# Patient Record
Sex: FEMALE | Race: WHITE | NOT HISPANIC OR LATINO | Employment: FULL TIME | URBAN - METROPOLITAN AREA
[De-identification: names, ages, dates, MRNs, and addresses within clinical notes are randomized per-mention and may not be internally consistent; named-entity substitution may affect disease eponyms.]

---

## 2018-01-11 ENCOUNTER — OFFICE VISIT (OUTPATIENT)
Dept: URGENT CARE | Facility: CLINIC | Age: 28
End: 2018-01-11
Payer: COMMERCIAL

## 2018-01-11 PROCEDURE — 99283 EMERGENCY DEPT VISIT LOW MDM: CPT

## 2018-01-11 PROCEDURE — G0382 LEV 3 HOSP TYPE B ED VISIT: HCPCS

## 2018-01-13 NOTE — PROGRESS NOTES
Assessment   1  Acute epigastric pain ([phone] [id number]) (R10 13)    Discussion/Summary   Discussion Summary:    Patient to proceed to ED for further evaluation  Chief Complaint   1  Abdominal Pain  Chief Complaint Free Text Note Form: Patient relates started with mid abdominal pain and epigastric pain since 9:00 pm last night  + vomiting x1 today  Took TUMS today  + nausea  Denies diarrhea  History of Present Illness   HPI: 24yo F p/w epigastric pain, nausea, and lightheadedness x 20 hours  Pt states pain is 7/10 and burning in nature  Pt took several tums today without relief of sxs  Pt denies diarrhea shortness of breath chest pain palpitations  Hospital Based Practices Required Assessment:      Abuse And Domestic Violence Screen       Yes, the patient is safe at home  -- The patient states no one is hurting them  Depression And Suicide Screen  No, the patient has not had thoughts of hurting themself  No, the patient has not felt depressed in the past 7 days  Prefered Language is  english  Primary Language is  english  Review of Systems   Focused-Female:      Constitutional: No fever, no chills, feels well, no tiredness, no recent weight gain or loss  ENT: no ear ache, no loss of hearing, no nosebleeds or nasal discharge, no sore throat or hoarseness  Cardiovascular: no complaints of slow or fast heart rate, no chest pain, no palpitations, no leg claudication or lower extremity edema  Respiratory: no complaints of shortness of breath, no wheezing, no dyspnea on exertion, no orthopnea or PND  Breasts: no complaints of breast pain, breast lump or nipple discharge  Gastrointestinal: abdominal pain,-- nausea-- and-- vomiting, but-- no constipation,-- no diarrhea-- and-- no blood in stools  Genitourinary: no complaints of dysuria, no incontinence, no pelvic pain, no dysmenorrhea, no vaginal discharge or abnormal vaginal bleeding        Musculoskeletal: no complaints of arthralgia, no myalgia, no joint swelling or stiffness, no limb pain or swelling  Integumentary: no complaints of skin rash or lesion, no itching or dry skin, no skin wounds  Neurological: no complaints of headache, no confusion, no numbness or tingling, no dizziness or fainting  Past Medical History   1  No pertinent past medical history  Active Problems And Past Medical History Reviewed: The active problems and past medical history were reviewed and updated today  Family History   Mother    1  Family history of gastric ulcer (V18 59) (Z83 79)   2  Family history of hypertension (V17 49) (Z82 49)  Father    3  Family history of hypertension (V17 49) (Z82 49)  Family History Reviewed: The family history was reviewed and updated today  Social History    · Never a smoker  Social History Reviewed: The social history was reviewed and is unchanged  Surgical History   1  Denied: History Of Prior Surgery  Surgical History Reviewed: The surgical history was reviewed and updated today  Current Meds    1  No Reported Medications Recorded  Medication List Reviewed: The medication list was reviewed and updated today  Allergies   1  No Known Drug Allergies    Vitals   Signs   Recorded: [id number] 05:25PM   Temperature: 100 1 C, Tympanic  Heart Rate: 81  Respiration: 18  Systolic: 801, LUE, Sitting  Diastolic: 89, LUE, Sitting  Height: 5 ft 5 in  Weight: 139 lb 2 oz  BMI Calculated: 23 15  BSA Calculated: 1 7  O2 Saturation: 99, RA  Pain Scale: 7    Physical Exam        Constitutional      General appearance: Abnormal   acutely ill  Ears, Nose, Mouth, and Throat      External inspection of ears and nose: Normal        Otoscopic examination: Tympanic membranes translucent with normal light reflex  Canals patent without erythema  Nasal mucosa, septum, and turbinates: Abnormal        Oropharynx: Normal with no erythema, edema, exudate or lesions  Pulmonary      Respiratory effort: No increased work of breathing or signs of respiratory distress  Auscultation of lungs: Clear to auscultation  no rales or crackles were heard bilaterally  no rhonchi  no friction rub  no wheezing  no diminished breath sounds  Cardiovascular      Palpation of heart: Normal PMI, no thrills  Auscultation of heart: Normal rate and rhythm, normal S1 and S2, without murmurs  Examination of extremities for edema and/or varicosities: Normal        Abdomen      Abdomen: Abnormal   The abdomen was flat  Bowel sounds were normal  There was severe tenderness in the epigastric area-- and-- in the right upper quadrant  The abdomen was not firm  No guarding  no masses palpated  The abdomen was normal to percussion  no CVA tenderness      Musculoskeletal      Gait and station: Normal        Digits and nails: Normal without clubbing or cyanosis  Inspection/palpation of joints, bones, and muscles: Normal        Skin      Skin and subcutaneous tissue: Normal without rashes or lesions  Neurologic      Cranial nerves: Cranial nerves 2-12 intact  Reflexes: 2+ and symmetric  Sensation: No sensory loss         Psychiatric      Orientation to person, place, and time: Normal        Mood and affect: Normal        Signatures    Electronically signed by : CHUCK Mobley; Jan 11 2018  5:48PM EST                       (Author)     Electronically signed by : MAEGAN Ellis ; Jan 12 2018 10:30AM EST                       (Co-author)

## 2018-01-23 VITALS
BODY MASS INDEX: 23.18 KG/M2 | DIASTOLIC BLOOD PRESSURE: 89 MMHG | OXYGEN SATURATION: 99 % | SYSTOLIC BLOOD PRESSURE: 129 MMHG | HEIGHT: 65 IN | HEART RATE: 81 BPM | WEIGHT: 139.13 LBS | RESPIRATION RATE: 18 BRPM

## 2018-01-30 ENCOUNTER — OFFICE VISIT (OUTPATIENT)
Dept: URGENT CARE | Facility: CLINIC | Age: 28
End: 2018-01-30
Payer: COMMERCIAL

## 2018-01-30 VITALS
HEART RATE: 80 BPM | BODY MASS INDEX: 24.49 KG/M2 | SYSTOLIC BLOOD PRESSURE: 116 MMHG | DIASTOLIC BLOOD PRESSURE: 57 MMHG | TEMPERATURE: 98.2 F | WEIGHT: 147 LBS | OXYGEN SATURATION: 99 % | HEIGHT: 65 IN

## 2018-01-30 DIAGNOSIS — K80.50 BILIARY COLIC: Primary | ICD-10-CM

## 2018-01-30 PROCEDURE — 99283 EMERGENCY DEPT VISIT LOW MDM: CPT | Performed by: PHYSICIAN ASSISTANT

## 2018-01-30 PROCEDURE — G0382 LEV 3 HOSP TYPE B ED VISIT: HCPCS | Performed by: PHYSICIAN ASSISTANT

## 2018-01-30 RX ORDER — KETOROLAC TROMETHAMINE 30 MG/ML
30 INJECTION, SOLUTION INTRAMUSCULAR; INTRAVENOUS ONCE
Status: COMPLETED | OUTPATIENT
Start: 2018-01-30 | End: 2018-01-30

## 2018-01-30 RX ADMIN — KETOROLAC TROMETHAMINE 30 MG: 30 INJECTION, SOLUTION INTRAMUSCULAR; INTRAVENOUS at 17:41

## 2018-01-31 NOTE — PROGRESS NOTES
Assessment/Plan:      Diagnoses and all orders for this visit:    Biliary colic  -     Cancel: Throat culture  -     ketorolac (TORADOL) injection 30 mg; Infuse 1 mL (30 mg total) into a venous catheter once       There are no Patient Instructions on file for this visit  Subjective:     Patient ID: Kaitlin Wall is a 24 y o  female  Abdominal Pain   This is a recurrent problem  The current episode started today  The onset quality is sudden  The problem occurs constantly  The problem has been unchanged  The pain is located in the RUQ  The pain is at a severity of 7/10  The quality of the pain is colicky  The abdominal pain does not radiate  Pertinent negatives include no constipation, diarrhea, nausea or vomiting  The pain is aggravated by eating  The pain is relieved by nothing  She has tried nothing for the symptoms  Review of Systems   Constitutional: Negative  Gastrointestinal: Positive for abdominal pain  Negative for abdominal distention, anal bleeding, blood in stool, constipation, diarrhea, nausea, rectal pain and vomiting  Objective:     Physical Exam   Cardiovascular: Normal rate, regular rhythm, normal heart sounds and normal pulses  Pulmonary/Chest: Effort normal and breath sounds normal    Abdominal: Soft  Normal appearance and bowel sounds are normal  There is tenderness in the right upper quadrant  There is positive James's sign  There is no rigidity, no rebound, no guarding, no CVA tenderness and no tenderness at McBurney's point

## 2018-02-04 ENCOUNTER — OFFICE VISIT (OUTPATIENT)
Dept: URGENT CARE | Facility: CLINIC | Age: 28
End: 2018-02-04
Payer: COMMERCIAL

## 2018-02-04 VITALS
WEIGHT: 147 LBS | DIASTOLIC BLOOD PRESSURE: 75 MMHG | TEMPERATURE: 97 F | RESPIRATION RATE: 18 BRPM | BODY MASS INDEX: 24.49 KG/M2 | HEIGHT: 65 IN | OXYGEN SATURATION: 97 % | SYSTOLIC BLOOD PRESSURE: 145 MMHG | HEART RATE: 84 BPM

## 2018-02-04 DIAGNOSIS — K81.0 CHOLECYSTITIS, ACUTE: Primary | ICD-10-CM

## 2018-02-04 PROCEDURE — 99213 OFFICE O/P EST LOW 20 MIN: CPT

## 2018-02-04 NOTE — LETTER
February 4, 2018     Patient: Kalpana Strickland   YOB: 1990   Date of Visit: 2/4/2018       To Whom It May Concern: It is my medical opinion that Kalpana Strickland may return to work on 02/05/2018  If you have any questions or concerns, please don't hesitate to call  Sincerely,    Adriana Roy, DO

## 2018-02-04 NOTE — PROGRESS NOTES
[facility]        NAME: Joyce Saxena is a 24 y o  female  : 1990    MRN: [id number]  DATE: 2018  TIME: 2:50 PM    Assessment and Plan   No primary diagnosis found  No diagnosis found  Patient Instructions      Follow-up with your surgeon as soon as possible  In meantime rest and avoid fatty meals  Chief Complaint     Chief Complaint   Patient presents with    Abdominal Pain     has gallstone and surgery schueduled for the 21         History of Present Illness   Joyce Saxena presents to the clinic c/o    She complains of upper right quadrant abdominal pain that she has had off and on for 3 months  She is set to have a cholecystectomy in 2 weeks  She has been missing a lot of work and needs a note for work she also asked for something for pain  Abdominal Pain   This is a new problem  The current episode started 1 to 4 weeks ago  The onset quality is gradual  The problem occurs intermittently  Associated symptoms include diarrhea, a fever, nausea and vomiting  Review of Systems   Review of Systems   Constitutional: Positive for activity change, appetite change and fever  HENT: Negative  Gastrointestinal: Positive for abdominal pain, diarrhea, nausea and vomiting  Endocrine: Negative  Genitourinary: Negative  Current Medications     No long-term prescriptions on file         Current Allergies     Allergies as of 2018    (No Known Allergies)            The following portions of the patient's history were reviewed and updated as appropriate: allergies, current medications, past family history, past medical history, past social history, past surgical history and problem list     Objective   /75 (BP Location: Left arm, Patient Position: Sitting, Cuff Size: Standard)   Pulse 84   Temp (!) 97 °F (36 1 °C)   Resp 18   Ht 5' 5" (1 651 m)   Wt 66 7 kg (147 lb)   LMP  (LMP Unknown)   SpO2 97%   BMI 24 46 kg/m²        Physical Exam Physical Exam   Constitutional: She appears well-developed  Eyes: EOM are normal  Pupils are equal, round, and reactive to light  Neck: Normal range of motion  Cardiovascular: Normal rate and regular rhythm  Pulmonary/Chest: Effort normal and breath sounds normal    Abdominal: Soft  Right upper quadrant abdominal tenderness  No masses no CVA tenderness  No rebound  Decreased bowel sounds  Musculoskeletal: Normal range of motion  Nursing note and vitals reviewed

## 2018-02-04 NOTE — LETTER
February 6, 2018     Patient: Diane Sun   YOB: 1990   Date of Visit: 2/4/2018       To Whom It May Concern: It is my medical opinion that Diane Sun may return to work on 02/06/2018  Patient seen at urgent care on 2/4/18       If you have any questions or concerns, please don't hesitate to call           Sincerely,        ALMA [place]    CC: No Recipients

## 2018-02-05 ENCOUNTER — TELEPHONE (OUTPATIENT)
Dept: URGENT CARE | Facility: CLINIC | Age: 28
End: 2018-02-05

## 2018-02-06 ENCOUNTER — TELEPHONE (OUTPATIENT)
Dept: URGENT CARE | Facility: CLINIC | Age: 28
End: 2018-02-06

## 2018-02-22 ENCOUNTER — OFFICE VISIT (OUTPATIENT)
Dept: URGENT CARE | Facility: CLINIC | Age: 28
End: 2018-02-22
Payer: COMMERCIAL

## 2018-02-22 VITALS
SYSTOLIC BLOOD PRESSURE: 142 MMHG | RESPIRATION RATE: 18 BRPM | WEIGHT: 144.4 LBS | HEIGHT: 65 IN | DIASTOLIC BLOOD PRESSURE: 67 MMHG | TEMPERATURE: 99.6 F | OXYGEN SATURATION: 98 % | BODY MASS INDEX: 24.06 KG/M2 | HEART RATE: 90 BPM

## 2018-02-22 DIAGNOSIS — R11.0 NAUSEA: Primary | ICD-10-CM

## 2018-02-22 DIAGNOSIS — K80.50 BILIARY COLIC: ICD-10-CM

## 2018-02-22 PROCEDURE — G0382 LEV 3 HOSP TYPE B ED VISIT: HCPCS | Performed by: PHYSICIAN ASSISTANT

## 2018-02-22 PROCEDURE — S9083 URGENT CARE CENTER GLOBAL: HCPCS | Performed by: PHYSICIAN ASSISTANT

## 2018-02-22 RX ORDER — ONDANSETRON 4 MG/1
4 TABLET, FILM COATED ORAL EVERY 8 HOURS PRN
Qty: 20 TABLET | Refills: 0 | Status: SHIPPED | OUTPATIENT
Start: 2018-02-22

## 2018-02-22 NOTE — LETTER
February 22, 2018     Patient: Haleigh Benson   YOB: 1990   Date of Visit: 2/22/2018       To Whom it May Concern:    Haleigh Benson was seen in my clinic on 2/22/2018  She may return to work on 2/24/18  If you have any questions or concerns, please don't hesitate to call           Sincerely,          Benito Arellano PA-C        CC: No Recipients

## 2018-02-26 NOTE — PROGRESS NOTES
Assessment/Plan:      Diagnoses and all orders for this visit:    Nausea  -     ondansetron (ZOFRAN) 4 mg tablet; Take 1 tablet (4 mg total) by mouth every 8 (eight) hours as needed for nausea or vomiting    Biliary colic        Patient Instructions   zofran for nausea  Pt to follow up with general surgeon as scheduled      Subjective:    Chief Complaint   Patient presents with    Nausea      Patient ID: Deepak Lai is a 24 y o  female  Pt suffering from biliary colic  Pt gallbladder removal recently rescheduled for 6 weeks from today  Nausea   This is a chronic problem  The problem occurs constantly  The problem has been unchanged (pt ran out of zofran from general surgeon)  Associated symptoms include nausea  Pertinent negatives include no abdominal pain, anorexia, arthralgias, change in bowel habit, chest pain, chills, congestion, coughing, diaphoresis, fatigue, fever, headaches, joint swelling, myalgias, neck pain, numbness, rash, sore throat, swollen glands, urinary symptoms, vertigo, visual change, vomiting or weakness  The symptoms are aggravated by eating  She has tried NSAIDs and rest for the symptoms  The treatment provided no relief  Review of Systems   Constitutional: Negative for chills, diaphoresis, fatigue and fever  HENT: Negative for congestion and sore throat  Respiratory: Negative for cough  Cardiovascular: Negative for chest pain  Gastrointestinal: Positive for nausea  Negative for abdominal distention, abdominal pain, anal bleeding, anorexia, blood in stool, change in bowel habit, constipation, diarrhea, rectal pain and vomiting  Musculoskeletal: Negative for arthralgias, joint swelling, myalgias and neck pain  Skin: Negative for rash  Neurological: Negative for vertigo, weakness, numbness and headaches           Objective:    /67 (BP Location: Left arm, Patient Position: Sitting, Cuff Size: Adult)   Pulse 90   Temp 99 6 °F (37 6 °C) (Tympanic)   Resp 18   Ht 5' 5" (1 651 m)   Wt 65 5 kg (144 lb 6 4 oz)   LMP  (LMP Unknown)   SpO2 98%   BMI 24 03 kg/m²      Physical Exam   Constitutional: She is oriented to person, place, and time  She appears well-developed and well-nourished  She does not have a sickly appearance  Pt appears to be in acute pain  HENT:   Head: Normocephalic and atraumatic  Cardiovascular: Normal rate, regular rhythm, normal heart sounds and intact distal pulses  Exam reveals no gallop and no friction rub  No murmur heard  Pulmonary/Chest: Effort normal and breath sounds normal  No respiratory distress  She has no wheezes  She has no rales  She exhibits no tenderness  Abdominal: Soft  Bowel sounds are normal  She exhibits no distension and no mass  There is tenderness (RUQ)  There is no rebound and no guarding  Neurological: She is alert and oriented to person, place, and time

## 2025-01-22 ENCOUNTER — HOSPITAL ENCOUNTER (EMERGENCY)
Facility: HOSPITAL | Age: 35
Discharge: HOME | End: 2025-01-22
Attending: EMERGENCY MEDICINE | Admitting: EMERGENCY MEDICINE
Payer: COMMERCIAL

## 2025-01-22 VITALS
WEIGHT: 165 LBS | DIASTOLIC BLOOD PRESSURE: 84 MMHG | BODY MASS INDEX: 27.46 KG/M2 | RESPIRATION RATE: 16 BRPM | SYSTOLIC BLOOD PRESSURE: 152 MMHG | TEMPERATURE: 98.4 F | HEART RATE: 96 BPM | OXYGEN SATURATION: 99 %

## 2025-01-22 DIAGNOSIS — K04.7 DENTAL ABSCESS: Primary | ICD-10-CM

## 2025-01-22 PROCEDURE — 99282 EMERGENCY DEPT VISIT SF MDM: CPT

## 2025-01-22 PROCEDURE — 99284 EMERGENCY DEPT VISIT MOD MDM: CPT | Performed by: EMERGENCY MEDICINE

## 2025-01-22 RX ORDER — NAPROXEN 500 MG/1
500 TABLET ORAL 2 TIMES DAILY PRN
Qty: 30 TABLET | Refills: 0 | Status: SHIPPED | OUTPATIENT
Start: 2025-01-22

## 2025-01-22 RX ORDER — OXYCODONE HYDROCHLORIDE 5 MG/1
5 TABLET ORAL EVERY 6 HOURS PRN
Qty: 15 TABLET | Refills: 0 | Status: SHIPPED | OUTPATIENT
Start: 2025-01-22

## 2025-01-22 RX ORDER — CLINDAMYCIN HYDROCHLORIDE 150 MG/1
450 CAPSULE ORAL EVERY 8 HOURS
Qty: 63 CAPSULE | Refills: 0 | Status: SHIPPED | OUTPATIENT
Start: 2025-01-22 | End: 2025-01-29

## 2025-01-22 NOTE — DISCHARGE INSTRUCTIONS
Please take medications as prescribed.  Return if any worsening as discussed.  Follow-up with dental as discussed.    Thank you for choosing the emergency department at [facility]. We appreciated the opportunity and privilege to address your healthcare needs. We remain available to you should you require additional evaluation or assistance. We value your feedback and would appreciate the opportunity to address anything you identified as an opportunity to improve or where we excelled. If there are colleagues who deserve special recognition, please let us know! We hope you are feeling better soon!    Please also note that sometimes there are subtle abnormalities in your lab values that you may observe when you access your record online.  These are frequently not worrisome and if they are of concern we will have discussed them with you.  However, we always encourage that you discuss any concerns you may have or observe on your record with your primary care provider.   Please also note that while your visit documentation was reviewed prior to completion, voice transcription will occasionally recognize words or grammar differently than what was spoken.

## 2025-01-22 NOTE — ED PROVIDER NOTES
Time reflects when diagnosis was documented in both MDM as applicable and the Disposition within this note       Time User Action Codes Description Comment    1/22/2025  7:29 AM Michael Pulido Add [K04.7] Dental abscess           ED Disposition       ED Disposition   Discharge    Condition   Stable    Date/Time   Wed Jan 22, 2025  7:29 AM    Comment   Gladys Layton discharge to home/self care.                   Assessment & Plan       Medical Decision Making  Problems Addressed:  Dental abscess: self-limited or minor problem    Risk  Prescription drug management.             Medications - No data to display    ED Risk Strat Scores                          SBIRT 22yo+      Flowsheet Row Most Recent Value   Initial Alcohol Screen: US AUDIT-C     1. How often do you have a drink containing alcohol? 0 Filed at: 01/22/2025 0731   2. How many drinks containing alcohol do you have on a typical day you are drinking?  0 Filed at: 01/22/2025 0731   3b. FEMALE Any Age, or MALE 65+: How often do you have 4 or more drinks on one occassion? 0 Filed at: 01/22/2025 0731   Audit-C Score 0 Filed at: 01/22/2025 0731   KATIE: How many times in the past year have you...    Used an illegal drug or used a prescription medication for non-medical reasons? Weekly Filed at: 01/22/2025 0731                            History of Present Illness       Chief Complaint   Patient presents with    Dental Pain     Reports started 2 days ago with right sided facial swelling, did not call dentist and came right here for antibiotics.        Past Medical History:   Diagnosis Date    Gall stones       Past Surgical History:   Procedure Laterality Date    CHOLECYSTECTOMY        Family History   Family history unknown: Yes      Social History     Tobacco Use    Smoking status: Former     Current packs/day: 0.25     Types: Cigarettes    Smokeless tobacco: Never   Vaping Use    Vaping status: Never Used   Substance Use Topics    Alcohol use: No    Drug use:  Yes     Types: Marijuana      E-Cigarette/Vaping    E-Cigarette Use Never User       E-Cigarette/Vaping Substances      I have reviewed and agree with the history as documented.     34-year-old female with right sided facial swelling which began over the last 2 days and getting progressively worse.  Patient reports that he has known dental caries.  Patient reports some difficulty opening her mouth.  Able to manage her saliva.  No fevers or chills.      History provided by:  Patient  Dental Pain  Location:  Lower  Quality:  Aching, localized and pulsating  Severity:  Moderate  Onset quality:  Gradual  Timing:  Constant  Progression:  Worsening  Context: abscess    Ineffective treatments:  None tried  Associated symptoms: facial pain, facial swelling, gum swelling and headaches    Associated symptoms: no congestion, no difficulty swallowing, no drooling, no fever, no neck swelling, no oral bleeding, no oral lesions and no trismus        Review of Systems   Constitutional:  Negative for fever.   HENT:  Positive for facial swelling. Negative for congestion, drooling and mouth sores.    Neurological:  Positive for headaches.           Objective       ED Triage Vitals [01/22/25 0716]   Temperature Pulse Blood Pressure Respirations SpO2 Patient Position - Orthostatic VS   98.4 °F (36.9 °C) 96 152/84 16 99 % Sitting      Temp Source Heart Rate Source BP Location FiO2 (%) Pain Score    Temporal Monitor Right arm -- 6      Vitals      Date and Time Temp Pulse SpO2 Resp BP Pain Score FACES Pain Rating User   01/22/25 0716 98.4 °F (36.9 °C) 96 99 % 16 152/84 6 -- BF            Physical Exam  Vitals and nursing note reviewed.   Constitutional:       General: She is in acute distress.      Appearance: She is normal weight. She is not ill-appearing or toxic-appearing.   HENT:      Head: Normocephalic and atraumatic.      Jaw: Tenderness, swelling and pain on movement present. No trismus or malocclusion.      Comments: No submental  edema     Right Ear: External ear normal.      Left Ear: External ear normal.      Nose: Nose normal.      Mouth/Throat:      Mouth: Mucous membranes are moist.      Dentition: Abnormal dentition.      Pharynx: No pharyngeal swelling or oropharyngeal exudate.   Pulmonary:      Effort: Pulmonary effort is normal.   Musculoskeletal:         General: No signs of injury.   Skin:     Coloration: Skin is not pale.   Neurological:      General: No focal deficit present.      Mental Status: She is alert.         Results Reviewed       None            No orders to display       Procedures    ED Medication and Procedure Management   None     Patient's Medications   Discharge Prescriptions    CLINDAMYCIN (CLEOCIN) 150 MG CAPSULE    Take 3 capsules (450 mg total) by mouth every 8 (eight) hours for 7 days       Start Date: 1/22/2025 End Date: 1/29/2025       Order Dose: 450 mg       Quantity: 63 capsule    Refills: 0    NAPROXEN (NAPROSYN) 500 MG TABLET    Take 1 tablet (500 mg total) by mouth 2 (two) times a day as needed for mild pain or moderate pain       Start Date: 1/22/2025 End Date: --       Order Dose: 500 mg       Quantity: 30 tablet    Refills: 0    OXYCODONE (ROXICODONE) 5 IMMEDIATE RELEASE TABLET    Take 1 tablet (5 mg total) by mouth every 6 (six) hours as needed for severe pain for up to 15 doses Max Daily Amount: 20 mg       Start Date: 1/22/2025 End Date: --       Order Dose: 5 mg       Quantity: 15 tablet    Refills: 0       ED SEPSIS DOCUMENTATION   Time reflects when diagnosis was documented in both MDM as applicable and the Disposition within this note       Time User Action Codes Description Comment    1/22/2025  7:29 AM Michael Pulido Add [K04.7] Dental abscess                  Michael Pulido DO  01/22/25 0736

## 2025-07-19 ENCOUNTER — HOSPITAL ENCOUNTER (EMERGENCY)
Facility: HOSPITAL | Age: 35
Discharge: HOME | End: 2025-07-19
Attending: STUDENT IN AN ORGANIZED HEALTH CARE EDUCATION/TRAINING PROGRAM | Admitting: STUDENT IN AN ORGANIZED HEALTH CARE EDUCATION/TRAINING PROGRAM
Payer: COMMERCIAL

## 2025-07-19 VITALS
TEMPERATURE: 98.5 F | HEART RATE: 103 BPM | DIASTOLIC BLOOD PRESSURE: 79 MMHG | OXYGEN SATURATION: 100 % | RESPIRATION RATE: 16 BRPM | SYSTOLIC BLOOD PRESSURE: 134 MMHG

## 2025-07-19 DIAGNOSIS — H66.001 NON-RECURRENT ACUTE SUPPURATIVE OTITIS MEDIA OF RIGHT EAR WITHOUT SPONTANEOUS RUPTURE OF TYMPANIC MEMBRANE: Primary | ICD-10-CM

## 2025-07-19 PROCEDURE — 99284 EMERGENCY DEPT VISIT MOD MDM: CPT | Performed by: STUDENT IN AN ORGANIZED HEALTH CARE EDUCATION/TRAINING PROGRAM

## 2025-07-19 PROCEDURE — 99282 EMERGENCY DEPT VISIT SF MDM: CPT

## 2025-07-19 RX ADMIN — AMOXICILLIN AND CLAVULANATE POTASSIUM 1 TABLET: 875; 125 TABLET, FILM COATED ORAL at 20:44

## 2025-07-20 NOTE — ED PROVIDER NOTES
Time reflects when diagnosis was documented in both MDM as applicable and the Disposition within this note       Time User Action Codes Description Comment    7/19/2025  8:41 PM Bernadette Rm Add [H66.001] Non-recurrent acute suppurative otitis media of right ear without spontaneous rupture of tympanic membrane           ED Disposition       ED Disposition   Discharge    Condition   Stable    Date/Time   Sat Jul 19, 2025  8:41 PM    Comment   Gladys Layton discharge to home/self care.                   Assessment & Plan       Medical Decision Making  - Vital signs reviewed.  Patient presents with right ear pain.  The patient has mild tenderness along the tragus but no signs of external ear cellulitis, external canal drainage/edema.  No clinical signs of mastoiditis.  Pain has been improving with hydroperoxide irrigation.  Patient denies recent water exposure.  The right TM is mildly erythematous/bulging.  TM is intact.  Will prescribe a course of Augmentin.  First dose administered.  Recommendation/return precautions were discussed.  All questions addressed.  Stable for discharge.    Problems Addressed:  Non-recurrent acute suppurative otitis media of right ear without spontaneous rupture of tympanic membrane: acute illness or injury    Risk  Prescription drug management.             Medications   amoxicillin-clavulanate (AUGMENTIN) 875-125 mg per tablet 1 tablet (1 tablet Oral Given 7/19/25 2044)       ED Risk Strat Scores                    No data recorded        SBIRT 20yo+      Flowsheet Row Most Recent Value   Initial Alcohol Screen: US AUDIT-C     1. How often do you have a drink containing alcohol? 0 Filed at: 07/19/2025 1939   2. How many drinks containing alcohol do you have on a typical day you are drinking?  0 Filed at: 07/19/2025 1939   3a. Male UNDER 65: How often do you have five or more drinks on one occasion? 0 Filed at: 07/19/2025 1939   3b. FEMALE Any Age, or MALE 65+: How often do you have 4  or more drinks on one occassion? 0 Filed at: 07/19/2025 1939   Audit-C Score 0 Filed at: 07/19/2025 1939   KATIE: How many times in the past year have you...    Used an illegal drug or used a prescription medication for non-medical reasons? Never Filed at: 07/19/2025 1939                            History of Present Illness       Chief Complaint   Patient presents with    Earache     Right ear pain for 3 days        Past Medical History[1]   Past Surgical History[2]   Family History[3]   Social History[4]   E-Cigarette/Vaping    E-Cigarette Use Never User       E-Cigarette/Vaping Substances      I have reviewed and agree with the history as documented.     34-year-old female.  Patient presents with right ear pain.  Improving over the last 3 days but not resolved.  Patient has been flushing her right ear with hydrogen peroxide.  Patient denies ear drainage, water exposure/water in ear.  Patient denies fever/chills.  Patient also expresses mildly muffled hearing.  Denies URI symptoms.      History provided by:  Patient  Earache  Location:  Right  Associated symptoms: hearing loss    Associated symptoms: no abdominal pain, no congestion, no cough, no ear discharge, no fever, no headaches, no rhinorrhea, no sore throat, no tinnitus and no vomiting      Review of Systems   Constitutional:  Negative for activity change, appetite change, fatigue and fever.   HENT:  Positive for ear pain and hearing loss. Negative for congestion, ear discharge, facial swelling, rhinorrhea, sinus pressure, sinus pain, sore throat, tinnitus, trouble swallowing and voice change.    Respiratory:  Negative for cough, chest tightness and shortness of breath.    Gastrointestinal:  Negative for abdominal pain, nausea and vomiting.   Neurological:  Negative for dizziness, light-headedness and headaches.     Objective       ED Triage Vitals [07/19/25 1940]   Temperature Pulse Blood Pressure Respirations SpO2 Patient Position - Orthostatic VS   98.5  °F (36.9 °C) 103 134/79 16 100 % Sitting      Temp Source Heart Rate Source BP Location FiO2 (%) Pain Score    Temporal Monitor Left arm -- 3      Vitals      Date and Time Temp Pulse SpO2 Resp BP Pain Score FACES Pain Rating User   07/19/25 1940 98.5 °F (36.9 °C) 103 100 % 16 134/79 3 -- AD            Physical Exam  Vitals and nursing note reviewed.   Constitutional:       General: She is not in acute distress.     Appearance: She is not ill-appearing or toxic-appearing.   HENT:      Head: Normocephalic and atraumatic.      Right Ear: External ear normal.      Left Ear: External ear normal. Tenderness present. No laceration, drainage or swelling.  No middle ear effusion. No foreign body. No mastoid tenderness. Tympanic membrane is erythematous and bulging. Tympanic membrane is not perforated.      Ears:      Comments: Mild tenderness along the tragus.  No signs of external ear cellulitis.  The external canal is nonedematous.  No drainage.  No tenderness along the mastoid.      Nose: No congestion or rhinorrhea.      Mouth/Throat:      Mouth: Mucous membranes are moist.      Pharynx: Oropharynx is clear. No oropharyngeal exudate or posterior oropharyngeal erythema.     Eyes:      General: No scleral icterus.        Right eye: No discharge.         Left eye: No discharge.      Extraocular Movements: Extraocular movements intact.      Conjunctiva/sclera: Conjunctivae normal.       Cardiovascular:      Rate and Rhythm: Normal rate and regular rhythm.      Pulses: Normal pulses.      Heart sounds: Normal heart sounds. No murmur heard.  Pulmonary:      Effort: Pulmonary effort is normal. No respiratory distress.      Breath sounds: Normal breath sounds. No stridor. No wheezing, rhonchi or rales.   Chest:      Chest wall: No tenderness.   Abdominal:      General: Bowel sounds are normal.     Musculoskeletal:      Right lower leg: No edema.      Left lower leg: No edema.     Skin:     General: Skin is warm and dry.      Neurological:      Mental Status: She is alert and oriented to person, place, and time.         Results Reviewed       None            No orders to display       Procedures    ED Medication and Procedure Management   Prior to Admission Medications   Prescriptions Last Dose Informant Patient Reported? Taking?   naproxen (NAPROSYN) 500 mg tablet   No No   Sig: Take 1 tablet (500 mg total) by mouth 2 (two) times a day as needed for mild pain or moderate pain   oxyCODONE (Roxicodone) 5 immediate release tablet   No No   Sig: Take 1 tablet (5 mg total) by mouth every 6 (six) hours as needed for severe pain for up to 15 doses Max Daily Amount: 20 mg      Facility-Administered Medications: None     Discharge Medication List as of 7/19/2025  8:42 PM        START taking these medications    Details   amoxicillin-clavulanate (AUGMENTIN) 875-125 mg per tablet Take 1 tablet by mouth every 12 (twelve) hours for 5 days, Starting Sat 7/19/2025, Until Thu 7/24/2025, Normal           CONTINUE these medications which have NOT CHANGED    Details   naproxen (NAPROSYN) 500 mg tablet Take 1 tablet (500 mg total) by mouth 2 (two) times a day as needed for mild pain or moderate pain, Starting Wed 1/22/2025, Normal      oxyCODONE (Roxicodone) 5 immediate release tablet Take 1 tablet (5 mg total) by mouth every 6 (six) hours as needed for severe pain for up to 15 doses Max Daily Amount: 20 mg, Starting Wed 1/22/2025, Normal           No discharge procedures on file.  ED SEPSIS DOCUMENTATION   Time reflects when diagnosis was documented in both MDM as applicable and the Disposition within this note       Time User Action Codes Description Comment    7/19/2025  8:41 PM Rm Fleming Add [H66.001] Non-recurrent acute suppurative otitis media of right ear without spontaneous rupture of tympanic membrane                    [1]   Past Medical History:  Diagnosis Date    Gall stones    [2]   Past Surgical History:  Procedure Laterality Date     CHOLECYSTECTOMY     [3]   Family History  Family history unknown: Yes   [4]   Social History  Tobacco Use    Smoking status: Former     Current packs/day: 0.25     Types: Cigarettes    Smokeless tobacco: Never   Vaping Use    Vaping status: Never Used   Substance Use Topics    Alcohol use: No    Drug use: Yes     Types: Marijuana        Rm Fleming DO  07/19/25 2053

## 2025-07-20 NOTE — DISCHARGE INSTRUCTIONS
You are being prescribed a course of Augmentin for treatment of a right ear infection.  Take as directed.  This medication may cause mild GI upset.  Take this medication with food/drink.    Keep the ear dry.  For pain, you can take ibuprofen 600 mg every 6 hours and Tylenol 1000 mg every 6 hours.    Return to the emergency department if your symptoms are no better in 48 to 72 hours.